# Patient Record
Sex: FEMALE | Race: WHITE | NOT HISPANIC OR LATINO | Employment: STUDENT | URBAN - METROPOLITAN AREA
[De-identification: names, ages, dates, MRNs, and addresses within clinical notes are randomized per-mention and may not be internally consistent; named-entity substitution may affect disease eponyms.]

---
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
## 2022-11-08 ENCOUNTER — OFFICE VISIT (OUTPATIENT)
Dept: URGENT CARE | Facility: CLINIC | Age: 22
End: 2022-11-08

## 2022-11-08 VITALS
HEART RATE: 104 BPM | DIASTOLIC BLOOD PRESSURE: 62 MMHG | OXYGEN SATURATION: 98 % | BODY MASS INDEX: 22.04 KG/M2 | HEIGHT: 56 IN | SYSTOLIC BLOOD PRESSURE: 104 MMHG | TEMPERATURE: 97.4 F | RESPIRATION RATE: 16 BRPM | WEIGHT: 98 LBS

## 2022-11-08 DIAGNOSIS — M25.462 EFFUSION, LEFT KNEE: ICD-10-CM

## 2022-11-08 DIAGNOSIS — M25.562 ACUTE PAIN OF LEFT KNEE: Primary | ICD-10-CM

## 2022-11-08 RX ORDER — MUPIROCIN 100 %
POWDER (GRAM) MISCELLANEOUS
COMMUNITY
Start: 2022-08-31

## 2022-11-08 RX ORDER — ECHINACEA PURPUREA EXTRACT 125 MG
2 TABLET ORAL
COMMUNITY
Start: 2022-08-22

## 2022-11-08 RX ORDER — HYDROXYZINE HYDROCHLORIDE 25 MG/1
25 TABLET, FILM COATED ORAL EVERY 6 HOURS PRN
COMMUNITY
Start: 2022-09-15

## 2022-11-08 RX ORDER — ESCITALOPRAM OXALATE 10 MG/1
10 TABLET ORAL DAILY
COMMUNITY
Start: 2022-10-17 | End: 2023-10-17

## 2022-11-08 RX ORDER — MELOXICAM 15 MG/1
15 TABLET ORAL DAILY
Qty: 20 TABLET | Refills: 0 | Status: SHIPPED | OUTPATIENT
Start: 2022-11-08

## 2022-11-08 NOTE — PROGRESS NOTES
[email or web address]    NAME: Cristobal Short is a 23 y o  female  : 2000    MRN: [id number]  DATE: 2022  TIME: 10:55 AM    Assessment and Plan   Acute pain of left knee [M25 562]  1  Acute pain of left knee  Knee Sleeves    meloxicam (Mobic) 15 mg tablet   2  Effusion, left knee  Knee Sleeves    meloxicam (Mobic) 15 mg tablet     Nursing staff applied knee sleeve to patient's left knee  This is a young's DME supplied item  Miles's paperwork completed  There was no acute orthopedic injury that occurred for this complaint  Patient Instructions   Patient Instructions   Knee wrap for compression and support  May do cold or warm compresses for comfort as needed  Stop ibuprofen and start meloxicam   May add Tylenol during the day if needed  Crutch or crutches for support as needed  Limit or avoid activities that aggravate pain  Follow-up with ortho as soon as possible  X-rays deferred here due to no acute impact injury  Will defer these studies to [facility]  Chief Complaint     Chief Complaint   Patient presents with   • Knee Pain     Patient states that on  she was sitting on the floor and then went to get up and had left knee pain  Hx of knee surgery on the left       History of Present Illness   Cristobal Short presents to the clinic c/o  68-year-old female comes in with acute left knee pain and swelling that started on   She had been sitting on the floor with her leg outstretched and felt some discomfort in her knee  She went to stand up and had increased pain lateral aspect  She is taken 4 ibuprofen total since this happened has well as some Tylenol  Has increased pain with weight-bearing, walking, bending  Unable to fully straighten leg without pain  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 orthopedist in 2 weeks  Review of Systems   Review of Systems   Constitutional: Positive for activity change and fatigue  Negative for chills and fever  Musculoskeletal: Positive for arthralgias, gait problem and joint swelling  Skin: Negative for color change  Current Medications     Long-Term Medications   Medication Sig Dispense Refill   • escitalopram (LEXAPRO) 10 mg tablet Take 10 mg by mouth daily     • hydrOXYzine HCL (ATARAX) 25 mg tablet Take 25 mg by mouth every 6 (six) hours as needed     • meloxicam (Mobic) 15 mg tablet Take 1 tablet (15 mg total) by mouth daily 20 tablet 0   • NUVARING 0 12-0 015 MG/24HR vaginal ring INSERT ONE (1) RING VAGINALLY AND LEAVE IN PLACE FOR THREE (3) WEEKS, THEN REMOVE FOR ONE (1) WEEK    1   • sodium chloride (OCEAN) 0 65 % nasal spray 2 sprays 5 (five) times a day     • FLUoxetine (PROzac) 10 mg capsule Take 10 mg by mouth daily (Patient not taking: Reported on 11/8/2022)         Current Allergies     Allergies as of 11/08/2022 - Reviewed 11/08/2022   Allergen Reaction Noted   • Penicillins Hives 03/23/2015          The following portions of the patient's history were reviewed and updated as appropriate: allergies, current medications, past family history, past medical history, past social history, past surgical history and problem list   Past Medical History:   Diagnosis Date   • Anxiety      Past Surgical History:   Procedure Laterality Date   • KNEE SURGERY       Family History   Problem Relation Age of Onset   • No Known Problems Mother    • No Known Problems Father        Objective   /62   Pulse 104   Temp (!) 97 4 °F (36 3 °C) (Tympanic)   Resp 16   Ht 4' 8" (1 422 m)   Wt 44 5 kg (98 lb)   LMP 09/13/2022 (Approximate)   SpO2 98%   BMI 21 97 kg/m²   Patient's last menstrual period was 09/13/2022 (approximate)  Physical Exam     Physical Exam  Vitals and nursing note reviewed  Constitutional:       General: She is not in acute distress  Appearance: She is well-developed  She is not ill-appearing, toxic-appearing or diaphoretic  Comments: Antalgic gait   Cardiovascular:      Rate and Rhythm: Normal rate  Pulmonary:      Effort: Pulmonary effort is normal  No respiratory distress  Musculoskeletal:         General: Swelling and tenderness present  No deformity or signs of injury  Left knee: Swelling, effusion and bony tenderness present  No erythema, ecchymosis, lacerations or crepitus  Decreased range of motion  Tenderness present over the lateral joint line, LCL and patellar tendon  No MCL, ACL or PCL tenderness  Normal pulse  Instability Tests: Anterior drawer test negative  Posterior drawer test negative  Medial Bronson test positive and lateral Bronson test positive  Comments: Unable to do full flexion or extension of left knee without increased pain  Mild limited range of motion  Pain with medial and lateral Bronson  Negative anterior drawer  Skin:     General: Skin is warm and dry  Findings: No bruising, erythema or lesion  Neurological:      Mental Status: She is alert and oriented to person, place, and time     Psychiatric:         Mood and Affect: Mood normal          Behavior: Behavior normal          Procedures

## 2022-11-08 NOTE — PATIENT INSTRUCTIONS
Knee wrap for compression and support  May do cold or warm compresses for comfort as needed  Stop ibuprofen and start meloxicam   May add Tylenol during the day if needed  Crutch or crutches for support as needed  Limit or avoid activities that aggravate pain  Follow-up with ortho as soon as possible  X-rays deferred here due to no acute impact injury  Will defer these studies to [facility]

## 2023-08-12 ENCOUNTER — OFFICE VISIT (OUTPATIENT)
Dept: URGENT CARE | Facility: MEDICAL CENTER | Age: 23
End: 2023-08-12
Payer: COMMERCIAL

## 2023-08-12 VITALS
DIASTOLIC BLOOD PRESSURE: 82 MMHG | RESPIRATION RATE: 20 BRPM | HEART RATE: 82 BPM | BODY MASS INDEX: 22.37 KG/M2 | SYSTOLIC BLOOD PRESSURE: 120 MMHG | WEIGHT: 99.8 LBS | TEMPERATURE: 98.8 F | OXYGEN SATURATION: 99 %

## 2023-08-12 DIAGNOSIS — R10.30 LOWER ABDOMINAL PAIN: Primary | ICD-10-CM

## 2023-08-12 LAB

## 2023-08-12 PROCEDURE — 81025 URINE PREGNANCY TEST: CPT | Performed by: FAMILY MEDICINE

## 2023-08-12 PROCEDURE — 81002 URINALYSIS NONAUTO W/O SCOPE: CPT | Performed by: FAMILY MEDICINE

## 2023-08-12 PROCEDURE — 99213 OFFICE O/P EST LOW 20 MIN: CPT | Performed by: FAMILY MEDICINE

## 2023-08-12 RX ORDER — LORAZEPAM 1 MG/1
1 TABLET ORAL EVERY 6 HOURS PRN
COMMUNITY
Start: 2023-08-10

## 2023-08-12 RX ORDER — LIDOCAINE 50 MG/G
1 PATCH TOPICAL EVERY 12 HOURS
COMMUNITY
Start: 2023-05-26 | End: 2024-05-25

## 2023-08-12 RX ORDER — DICYCLOMINE HCL 20 MG
20 TABLET ORAL EVERY 6 HOURS PRN
COMMUNITY
Start: 2023-08-10 | End: 2023-08-17

## 2023-08-12 NOTE — PROGRESS NOTES
[facility]        NAME: John Dominguez is a 23 y.o. female  : 2000    MRN: [id number]  DATE: 2023  TIME: 6:13 PM    Assessment and Plan   Lower abdominal pain [R10.30]  1. Lower abdominal pain  POCT urine dip    POCT urine HCG            Patient Instructions       Follow up with PCP in 3-5 days. Proceed to  ER if symptoms worsen. Chief Complaint     Chief Complaint   Patient presents with   • Abdominal Pain     2.5 weeks ago she started with abd pain that started in her pelvis and it moves up to her chest.  She was seen in the ED three days ago and got no relief. Pain is constant over the last couple of days. History of Present Illness       47-year-old female here today with complaint of abdominal pain on and off for the past 2-1/2 weeks. He is amenable the right upper and left upper quadrant radiating down the legs and unrelated to any food. She denies having any previous diagnosis gastrointestinal pathology. It was once told that she had IBS but that was ruled out. She does not have gluten intolerance. Recently seen at [facility] emergency room for abdominal pain approximately 2 days ago and was prescribed Bentyl 20 mg and Ativan. Bentyl does not seem to help symptoms however Ativan seems to help with symptoms of panic disorder. She has a history of anxiety in the past.  She has an appointment to see gastroenterologist that was scheduled by her mother. At the present time she is asymptomatic. Patient was researching and thought she might have ectopic pregnancy and decided come to urgent care for assessment. Review of Systems   Review of Systems   Constitutional: Negative. Gastrointestinal: Positive for abdominal pain. Negative for diarrhea, nausea and vomiting.          Current Medications       Current Outpatient Medications:   •  dicyclomine (BENTYL) 20 mg tablet, Take 20 mg by mouth every 6 (six) hours as needed, Disp: , Rfl:   •  lidocaine (LIDODERM) 5 %, Place 1 patch on the skin every 12 (twelve) hours, Disp: , Rfl:   •  LORazepam (ATIVAN) 1 mg tablet, Take 1 mg by mouth every 6 (six) hours as needed, Disp: , Rfl:   •  NUVARING 0.12-0.015 MG/24HR vaginal ring, INSERT ONE (1) RING VAGINALLY AND LEAVE IN PLACE FOR THREE (3) WEEKS, THEN REMOVE FOR ONE (1) WEEK., Disp: , Rfl: 1  •  cyclobenzaprine (FLEXERIL) 5 mg tablet, Take 5 mg by mouth (Patient not taking: Reported on 4/19/2023), Disp: , Rfl:   •  escitalopram (LEXAPRO) 10 mg tablet, Take 10 mg by mouth daily (Patient not taking: Reported on 8/12/2023), Disp: , Rfl:   •  FLUoxetine (PROzac) 10 mg capsule, Take 10 mg by mouth daily (Patient not taking: Reported on 11/8/2022), Disp: , Rfl:   •  hydrOXYzine HCL (ATARAX) 25 mg tablet, Take 25 mg by mouth every 6 (six) hours as needed, Disp: , Rfl:   •  meloxicam (Mobic) 15 mg tablet, Take 1 tablet (15 mg total) by mouth daily (Patient not taking: Reported on 4/19/2023), Disp: 20 tablet, Rfl: 0  •  Mupirocin POWD, Add contents of one capsule to 240 ml saline.  Irrigate each nostril with 120 ml of medicated saline twice daily (Patient not taking: Reported on 11/8/2022), Disp: , Rfl:   •  sodium chloride (OCEAN) 0.65 % nasal spray, 2 sprays 5 (five) times a day (Patient not taking: Reported on 4/19/2023), Disp: , Rfl:     Current Allergies     Allergies as of 08/12/2023 - Reviewed 08/12/2023   Allergen Reaction Noted   • Penicillins Hives 03/23/2015   • Sulfamethoxazole-trimethoprim Itching and Rash 12/20/2021            The following portions of the patient's history were reviewed and updated as appropriate: allergies, current medications, past family history, past medical history, past social history, past surgical history and problem list.     Past Medical History:   Diagnosis Date   • Anxiety        Past Surgical History:   Procedure Laterality Date   • KNEE SURGERY         Family History   Problem Relation Age of Onset   • No Known Problems Mother    • No Known Problems Father          Medications have been verified. Objective   /82   Pulse 82   Temp 98.8 °F (37.1 °C)   Resp 20   Wt 45.3 kg (99 lb 12.8 oz)   LMP  (LMP Unknown) Comment: June 1 st  SpO2 99%   BMI 22.37 kg/m²   No LMP recorded (lmp unknown). Physical Exam     Physical Exam  Vitals and nursing note reviewed. Constitutional:       Appearance: She is well-developed. Abdominal:      General: Abdomen is flat. Bowel sounds are normal.      Palpations: Abdomen is soft. Tenderness: There is abdominal tenderness in the left lower quadrant. There is no rebound. Negative signs include Peres's sign and Rovsing's sign. Neurological:      Mental Status: She is alert.

## 2023-08-12 NOTE — PATIENT INSTRUCTIONS
Urine pregnancy test-negative. Urine dipstick is unremarkable. I checked to see if there is any potential interaction between Bentyl and Ativan no significant interaction noted. Patient advised she may take as needed. I recommend however she keep appointment with GI specialist.  She is to continue performing her food diary. Abdominal Pain   WHAT YOU NEED TO KNOW:   Abdominal pain can be dull, achy, or sharp. You may have pain in one area of your abdomen, or in your entire abdomen. Your pain may be caused by a condition such as constipation, food sensitivity or poisoning, infection, or a blockage. Abdominal pain can also be from a hernia, appendicitis, or an ulcer. Liver, gallbladder, or kidney conditions can also cause abdominal pain. The cause of your abdominal pain may not be known. DISCHARGE INSTRUCTIONS:   Call your local emergency number (91 in the [address]) if:   You have chest pain or shortness of breath. Return to the emergency department if:   You have pulsing pain in your upper abdomen or lower back that suddenly becomes constant. Your pain is in the right lower abdominal area and worsens with movement. You have a fever over 100.4°F (38°C) or shaking chills. You are vomiting and cannot keep food or liquids down. Your pain does not improve or gets worse over the next 8 to 12 hours. You see blood in your vomit or bowel movements, or they look black and tarry. Your skin or the whites of your eyes turn yellow. You are a woman and have a large amount of vaginal bleeding that is not your monthly period. Call your doctor if:   You have pain in your lower back. You are a man and have pain in your testicles. You have pain when you urinate. You have questions or concerns about your condition or care. Medicines: You may need any of the following:  Medicines  may be given to calm your stomach or prevent vomiting. Prescription pain medicine  may be given.  Ask your healthcare provider how to take this medicine safely. Some prescription pain medicines contain acetaminophen. Do not take other medicines that contain acetaminophen without talking to your healthcare provider. Too much acetaminophen may cause liver damage. Prescription pain medicine may cause constipation. Ask your healthcare provider how to prevent or treat constipation. Take your medicine as directed. Contact your healthcare provider if you think your medicine is not helping or if you have side effects. Tell your provider if you are allergic to any medicine. Keep a list of the medicines, vitamins, and herbs you take. Include the amounts, and when and why you take them. Bring the list or the pill bottles to follow-up visits. Carry your medicine list with you in case of an emergency. Manage or prevent abdominal pain:   Apply heat  on your abdomen for 20 to 30 minutes every 2 hours for as many days as directed. Heat helps decrease pain and muscle spasms. Make changes to the foods you eat, if needed. Do not eat foods that cause abdominal pain or other symptoms. Eat small meals more often. The following changes may also help:    Eat more high-fiber foods if you are constipated. High-fiber foods include fruits, vegetables, whole-grain foods, and legumes such as morel beans. Do not eat foods that cause gas if you have bloating. Examples include broccoli, cabbage, beans, and carbonated drinks. Do not eat foods or drinks that contain sorbitol or fructose if you have diarrhea and bloating. Some examples are fruit juices, candy, jelly, and sugar-free gum. Do not eat high-fat foods. Examples include fried foods, cheeseburgers, hot dogs, and desserts. Make changes to the liquids you drink, if needed. Do not drink liquids that cause pain or make it worse, such as orange juice. Drink liquids throughout the day to stay hydrated.  The following changes may also help:    Drink more liquids to prevent dehydration from diarrhea or vomiting. Ask your healthcare provider how much liquid to drink each day and which liquids are best for you. Limit or do not have caffeine. Caffeine may make symptoms such as heartburn or nausea worse. Limit or do not drink alcohol. Alcohol can make your abdominal pain worse. Ask your healthcare provider if it is okay for you to drink alcohol. Also ask how much is okay for you to drink. A drink of alcohol is 12 ounces of beer, ½ ounce of liquor, or 5 ounces of wine. Keep a diary of your abdominal pain. A diary may help your healthcare provider learn what is causing your pain. Include when the pain happens, how long it lasts, and what the pain feels like. Write down any other symptoms you have with abdominal pain. Also write down what you eat, and any symptoms you have after you eat. Manage stress. Stress may cause abdominal pain. Your healthcare provider may recommend relaxation techniques and deep breathing exercises to help decrease your stress. Your healthcare provider may recommend you talk to someone about your stress or anxiety, such as a counselor or a friend. Get plenty of sleep. Exercise regularly. Do not smoke. Nicotine and other chemicals in cigarettes can damage your esophagus and stomach. Ask your healthcare provider for information if you currently smoke and need help to quit. E-cigarettes or smokeless tobacco still contain nicotine. Talk to your healthcare provider before you use these products. Follow up with your doctor as directed:  Write down your questions so you remember to ask them during your visits. © Copyright [employer] 2022 Information is for End User's use only and may not be sold, redistributed or otherwise used for commercial purposes. The above information is an  only. It is not intended as medical advice for individual conditions or treatments.  Talk to your doctor, nurse or pharmacist before following any medical regimen to see if it is safe and effective for you.